# Patient Record
Sex: MALE | Race: WHITE | Employment: UNEMPLOYED | URBAN - METROPOLITAN AREA
[De-identification: names, ages, dates, MRNs, and addresses within clinical notes are randomized per-mention and may not be internally consistent; named-entity substitution may affect disease eponyms.]

---

## 2024-05-31 ENCOUNTER — HOSPITAL ENCOUNTER (EMERGENCY)
Age: 24
Discharge: HOME | End: 2024-05-31
Attending: EMERGENCY MEDICINE
Payer: MEDICAID

## 2024-05-31 VITALS
RESPIRATION RATE: 16 BRPM | DIASTOLIC BLOOD PRESSURE: 69 MMHG | HEART RATE: 96 BPM | WEIGHT: 233 LBS | BODY MASS INDEX: 34.51 KG/M2 | SYSTOLIC BLOOD PRESSURE: 125 MMHG | OXYGEN SATURATION: 95 % | HEIGHT: 69 IN | TEMPERATURE: 97.9 F
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
## 2024-05-31 DIAGNOSIS — S60.449A CONSTRICTIVE JEWELRY OF FINGER, INITIAL ENCOUNTER: Primary | ICD-10-CM

## 2024-05-31 PROCEDURE — 99283 EMERGENCY DEPT VISIT LOW MDM: CPT

## 2024-05-31 ASSESSMENT — PAIN - FUNCTIONAL ASSESSMENT: PAIN_FUNCTIONAL_ASSESSMENT: NONE - DENIES PAIN

## 2024-05-31 NOTE — ED PROVIDER NOTES
with instructions use NSAIDs and ice as needed.  Discharged in stable condition.  Return precautions provided        Amount and/or Complexity of Data Reviewed  Clinical lab tests: reviewed  Decide to obtain previous medical records or to obtain history from someone other than the patient: yes       -  Patient seen and evaluated in the emergency department.  -  Triage and nursing notes reviewed and incorporated.  -  Old chart records reviewed and incorporated.  -  Work-up included:  See above      Appropriate PPE utilized as indicated for entire patient encounter?  Time of Disposition: See timeline      Independent Imaging Interpretation by me: None     EKG (if obtained): None     Chronic conditions affecting care: None     Discussion with Other Profesionals : None       Social Determinants : None          I am the Primary Clinician of Record.    ?  New Prescriptions    No medications on file     FINAL IMPRESSION  1. Constrictive jewelry of finger, initial encounter        Electronically signed by: Jaswant Nuno DO, 5/31/2024         Jaswant Nuno DO  05/31/24 0610

## 2024-05-31 NOTE — DISCHARGE INSTRUCTIONS
You had what is known as a ring tourniquet syndrome today due to the ring stuck on her finger.  Ice the area today and take ibuprofen to help with swelling.

## 2024-05-31 NOTE — ED TRIAGE NOTES
Right 3rd digit swollen after placing ring on incorrect finger. EMS attempted to cut ring off x 30 min without success

## 2024-10-16 ENCOUNTER — HOSPITAL ENCOUNTER (EMERGENCY)
Age: 24
Discharge: HOME | End: 2024-10-16
Attending: EMERGENCY MEDICINE
Payer: MEDICAID

## 2024-10-16 VITALS
RESPIRATION RATE: 18 BRPM | BODY MASS INDEX: 35.55 KG/M2 | OXYGEN SATURATION: 97 % | TEMPERATURE: 97.8 F | HEART RATE: 80 BPM | DIASTOLIC BLOOD PRESSURE: 97 MMHG | WEIGHT: 240 LBS | SYSTOLIC BLOOD PRESSURE: 159 MMHG | HEIGHT: 69 IN

## 2024-10-16 DIAGNOSIS — K04.7 DENTAL INFECTION: Primary | ICD-10-CM

## 2024-10-16 PROCEDURE — 6370000000 HC RX 637 (ALT 250 FOR IP): Performed by: EMERGENCY MEDICINE

## 2024-10-16 PROCEDURE — 96372 THER/PROPH/DIAG INJ SC/IM: CPT

## 2024-10-16 PROCEDURE — 99284 EMERGENCY DEPT VISIT MOD MDM: CPT

## 2024-10-16 PROCEDURE — 6360000002 HC RX W HCPCS: Performed by: EMERGENCY MEDICINE

## 2024-10-16 RX ORDER — OXYCODONE HYDROCHLORIDE 5 MG/1
5 TABLET ORAL ONCE
Status: COMPLETED | OUTPATIENT
Start: 2024-10-16 | End: 2024-10-16

## 2024-10-16 RX ORDER — KETOROLAC TROMETHAMINE 30 MG/ML
30 INJECTION, SOLUTION INTRAMUSCULAR; INTRAVENOUS ONCE
Status: COMPLETED | OUTPATIENT
Start: 2024-10-16 | End: 2024-10-16

## 2024-10-16 RX ORDER — KETOROLAC TROMETHAMINE 10 MG/1
10 TABLET, FILM COATED ORAL EVERY 6 HOURS PRN
Qty: 15 TABLET | Refills: 0 | Status: SHIPPED | OUTPATIENT
Start: 2024-10-16 | End: 2025-10-16

## 2024-10-16 RX ADMIN — OXYCODONE HYDROCHLORIDE 5 MG: 5 TABLET ORAL at 15:18

## 2024-10-16 RX ADMIN — KETOROLAC TROMETHAMINE 30 MG: 30 INJECTION, SOLUTION INTRAMUSCULAR; INTRAVENOUS at 15:19

## 2024-10-16 RX ADMIN — AMOXICILLIN AND CLAVULANATE POTASSIUM 1 TABLET: 875; 125 TABLET, FILM COATED ORAL at 15:18

## 2024-10-16 ASSESSMENT — PAIN - FUNCTIONAL ASSESSMENT: PAIN_FUNCTIONAL_ASSESSMENT: 0-10

## 2024-10-16 ASSESSMENT — PAIN DESCRIPTION - LOCATION: LOCATION: TEETH

## 2024-10-16 ASSESSMENT — PAIN DESCRIPTION - ORIENTATION: ORIENTATION: RIGHT;LOWER

## 2024-10-16 ASSESSMENT — PAIN DESCRIPTION - PAIN TYPE: TYPE: ACUTE PAIN

## 2024-10-16 ASSESSMENT — PAIN DESCRIPTION - FREQUENCY: FREQUENCY: CONTINUOUS

## 2024-10-16 ASSESSMENT — PAIN DESCRIPTION - DESCRIPTORS: DESCRIPTORS: SORE

## 2024-10-16 NOTE — ED PROVIDER NOTES
Emergency Department Encounter  Location: [facility]    Patient: Chaka Arellano  MRN: [id number]  : 2000  Date of evaluation: 10/16/2024  ED Provider: Virginia Parham DO    Chief Complaint:    Dental Pain (Pt states having right dental Abcess (In Mouth) has been on antibiotics for this has finished. Pt cannot get tooth pulled due to insurance.)    [place]:  Chaka Arellano is a 23 y.o. male that presents to the emergency department with concern for right lower jaw pain.  Patient states that he has had trouble with the tooth for the past several weeks.  He was actually seen at another facility at the end of September and placed on a course of penicillin.  He states the pain and swelling initially improved with this.  It has worsened since he completed the antibiotics last week.  Patient states he has been drinking cool liquids but cannot tolerate anything warm or hot.  No measured fever, chills or vomiting.  States he has seen the dentist and needs to have the tooth pulled but he is still working with his insurance to get this covered.    History reviewed. No pertinent past medical history.  History reviewed. No pertinent surgical history.  History reviewed. No pertinent family history.  Social History     Socioeconomic History    Marital status:      Spouse name: Not on file    Number of children: Not on file    Years of education: Not on file    Highest education level: Not on file   Occupational History    Not on file   Tobacco Use    Smoking status: Every Day     Types: Cigarettes    Smokeless tobacco: Not on file   Vaping Use    Vaping status: Never Used   Substance and Sexual Activity    Alcohol use: Yes     Comment: occ    Drug use: Never    Sexual activity: Not on file   Other Topics Concern    Not on file   Social History Narrative    Not on file     Social Determinants of Health     Financial Resource Strain: Not on file   Food Insecurity: Not on file   Transportation

## 2024-10-16 NOTE — ED TRIAGE NOTES
Pt states having right dental Abcess (In Mouth) has been on antibiotics for this has finished. Pt cannot get tooth pulled due to insurance.

## 2025-01-19 ENCOUNTER — HOSPITAL ENCOUNTER (EMERGENCY)
Age: 25
Discharge: HOME | End: 2025-01-19
Attending: STUDENT IN AN ORGANIZED HEALTH CARE EDUCATION/TRAINING PROGRAM
Payer: MEDICAID

## 2025-01-19 VITALS
HEIGHT: 69 IN | OXYGEN SATURATION: 96 % | DIASTOLIC BLOOD PRESSURE: 81 MMHG | WEIGHT: 243.2 LBS | SYSTOLIC BLOOD PRESSURE: 127 MMHG | BODY MASS INDEX: 36.02 KG/M2 | RESPIRATION RATE: 14 BRPM | TEMPERATURE: 97.4 F | HEART RATE: 76 BPM

## 2025-01-19 DIAGNOSIS — J02.9 VIRAL PHARYNGITIS: Primary | ICD-10-CM

## 2025-01-19 LAB
INFLUENZA A BY PCR: NOT DETECTED
INFLUENZA B BY PCR: NOT DETECTED
S PYO AG THROAT QL: NEGATIVE
SPECIMEN SOURCE: NORMAL

## 2025-01-19 PROCEDURE — 87502 INFLUENZA DNA AMP PROBE: CPT

## 2025-01-19 PROCEDURE — 87081 CULTURE SCREEN ONLY: CPT

## 2025-01-19 PROCEDURE — 99283 EMERGENCY DEPT VISIT LOW MDM: CPT

## 2025-01-19 PROCEDURE — 87880 STREP A ASSAY W/OPTIC: CPT

## 2025-01-19 PROCEDURE — 6360000002 HC RX W HCPCS: Performed by: STUDENT IN AN ORGANIZED HEALTH CARE EDUCATION/TRAINING PROGRAM

## 2025-01-19 RX ORDER — DEXAMETHASONE 4 MG/1
10 TABLET ORAL
Status: COMPLETED | OUTPATIENT
Start: 2025-01-19 | End: 2025-01-19

## 2025-01-19 RX ADMIN — DEXAMETHASONE 10 MG: 4 TABLET ORAL at 15:04

## 2025-01-19 ASSESSMENT — PAIN DESCRIPTION - PAIN TYPE
TYPE: ACUTE PAIN
TYPE: ACUTE PAIN

## 2025-01-19 ASSESSMENT — PAIN - FUNCTIONAL ASSESSMENT
PAIN_FUNCTIONAL_ASSESSMENT: 0-10
PAIN_FUNCTIONAL_ASSESSMENT: 0-10
PAIN_FUNCTIONAL_ASSESSMENT: ACTIVITIES ARE NOT PREVENTED
PAIN_FUNCTIONAL_ASSESSMENT: ACTIVITIES ARE NOT PREVENTED

## 2025-01-19 ASSESSMENT — PAIN DESCRIPTION - DESCRIPTORS
DESCRIPTORS: SORE
DESCRIPTORS: ACHING;SORE

## 2025-01-19 ASSESSMENT — PAIN DESCRIPTION - LOCATION
LOCATION: THROAT
LOCATION: THROAT

## 2025-01-19 ASSESSMENT — PAIN DESCRIPTION - FREQUENCY
FREQUENCY: INTERMITTENT
FREQUENCY: INTERMITTENT

## 2025-01-19 ASSESSMENT — PAIN SCALES - GENERAL
PAINLEVEL_OUTOF10: 4
PAINLEVEL_OUTOF10: 4

## 2025-01-19 NOTE — ED NOTES
The patient presents to the er today with complaints of a sore throat, cough and congestion that started this morning.

## 2025-01-19 NOTE — ED PROVIDER NOTES
EMERGENCY DEPARTMENT HISTORY AND PHYSICAL EXAM      Patient Name: Chaka Arellano  MRN: [id number]  : 2000  Date of Evaluation: 2025  ED Provider:  Kenan Carney DO    History of Presenting Illness     Chief Complaint:   Chief Complaint   Patient presents with    Pharyngitis    Cough    Nasal Congestion     Reports of a cough, sore throat, and congestion that started this morning.             HPI: Patient is a 24 y.o. male with no significant past medical history presenting the emergency department chief complaint of sore throat.  Patient states his symptoms all started this morning.  Patient reports associated congestion and cough.  Patient denies any chest pain or shortness of breath.  Patient is fevers or chills.  Patient states it hurts to swallow but he does not have any difficulty drinking or eating.  Patient denies any known sick contacts.          Past History     Past Medical History: No past medical history on file.  Surgical History: No past surgical history on file.  Family History: No family history on file.  Social History:   Social History     Socioeconomic History    Marital status:      Spouse name: Not on file    Number of children: Not on file    Years of education: Not on file    Highest education level: Not on file   Occupational History    Not on file   Tobacco Use    Smoking status: Every Day     Types: Cigarettes    Smokeless tobacco: Never   Vaping Use    Vaping status: Never Used   Substance and Sexual Activity    Alcohol use: Yes     Comment: occ    Drug use: Never    Sexual activity: Defer   Other Topics Concern    Not on file   Social History Narrative    Not on file     Social Determinants of Health     Financial Resource Strain: Not on file   Food Insecurity: Food Insecurity Present (12/15/2024)    Received from [facility]    Hunger Vital Sign     Worried About Running Out of Food in the Last Year: Sometimes true     Ran Out of Food in the Last Year:

## 2025-01-19 NOTE — ED NOTES
Discharge instructions were reviewed and the patient will follow up with the PCP. The patient voiced understanding.

## 2025-01-19 NOTE — DISCHARGE INSTR - COC
Adan  is necessary for the continuing treatment of the diagnosis listed and that he requires {Admit to Appropriate Level of Care:41251} for {GREATER/LESS:736168112} 30 days.     Update Admission H&P: {CHP DME Changes in HandP:793249824}    PHYSICIAN SIGNATURE:  {Esignature:412792592}

## 2025-01-22 LAB
MICROORGANISM SPEC CULT: NORMAL
SPECIMEN DESCRIPTION: NORMAL